# Patient Record
Sex: FEMALE | Race: WHITE | Employment: FULL TIME | URBAN - METROPOLITAN AREA
[De-identification: names, ages, dates, MRNs, and addresses within clinical notes are randomized per-mention and may not be internally consistent; named-entity substitution may affect disease eponyms.]

---

## 2019-03-12 ENCOUNTER — TELEPHONE (OUTPATIENT)
Dept: OTHER | Facility: CLINIC | Age: 34
End: 2019-03-12

## 2019-08-01 ENCOUNTER — TRANSFERRED RECORDS (OUTPATIENT)
Dept: MULTI SPECIALTY CLINIC | Facility: CLINIC | Age: 34
End: 2019-08-01

## 2019-08-01 LAB — PAP SMEAR - HIM PATIENT REPORTED: NEGATIVE

## 2020-02-20 ENCOUNTER — OFFICE VISIT (OUTPATIENT)
Dept: FAMILY MEDICINE | Facility: CLINIC | Age: 35
End: 2020-02-20
Payer: COMMERCIAL

## 2020-02-20 VITALS
OXYGEN SATURATION: 98 % | HEART RATE: 82 BPM | TEMPERATURE: 98.8 F | SYSTOLIC BLOOD PRESSURE: 102 MMHG | DIASTOLIC BLOOD PRESSURE: 62 MMHG | WEIGHT: 151 LBS

## 2020-02-20 DIAGNOSIS — H53.8 BLURRED VISION: Primary | ICD-10-CM

## 2020-02-20 PROCEDURE — 99203 OFFICE O/P NEW LOW 30 MIN: CPT | Performed by: FAMILY MEDICINE

## 2020-02-20 RX ORDER — CITALOPRAM HYDROBROMIDE 40 MG/1
TABLET ORAL
COMMUNITY
Start: 2019-08-02 | End: 2020-07-13

## 2020-02-20 RX ORDER — VITAMIN E 268 MG
CAPSULE ORAL
COMMUNITY
End: 2020-05-20

## 2020-02-20 RX ORDER — COPPER 313.4 MG/1
1 INTRAUTERINE DEVICE INTRAUTERINE ONCE
COMMUNITY

## 2020-02-20 NOTE — Clinical Note
Please abstract the following data from this visit with this patient into the appropriate field in Epic:Tests that can be patient reported without a hard copy:Pap smear done on this date: 8/2019 (approximately), by this group: [facility] , results were  Normal . Other Tests found in the patient's chart through Chart Review/Care Everywhere:{Abstract Quality List (Optional):875228}Note to Abstraction: If this section is blank, no results were found via Chart Review/Care Everywhere.

## 2020-02-20 NOTE — PROGRESS NOTES
Subjective     Leslie Villanueva is a 34 year old female who presents to clinic today for the following health issues:    HPI   Concern - vision concerns   Onset: yesterday for a few hours     Description:   Pt was having blurred, double vision     Intensity: mild    Progression of Symptoms:  improving    Accompanying Signs & Symptoms:      Previous history of similar problem:       Precipitating factors:   Worsened by:     Alleviating factors:  Improved by:     Therapies Tried and outcome:       There is no problem list on file for this patient.    No past surgical history on file.    Social History     Tobacco Use     Smoking status: Never Smoker     Smokeless tobacco: Never Used   Substance Use Topics     Alcohol use: Not on file     No family history on file.      Current Outpatient Medications   Medication Sig Dispense Refill     citalopram 40 MG PO tablet TK 1 T PO HS UTD       paragard intrauterine copper IU device 1 each by Intrauterine route once       cholecalciferol 25 MCG (1000 UT) PO TABS Take 1,000 Units by mouth       vitamin E (VITAMIN E) 400 units (360 mg) PO capsule        No Known Allergies  No lab results found.   BP Readings from Last 3 Encounters:   02/20/20 102/62    Wt Readings from Last 3 Encounters:   02/20/20 68.5 kg (151 lb)                    Reviewed and updated as needed this visit by Provider         Review of Systems   ROS COMP: Constitutional, HEENT, cardiovascular, pulmonary, gi and gu systems are negative, except as otherwise noted.      Objective    /62   Pulse 82   Temp 98.8  F (37.1  C) (Oral)   Wt 68.5 kg (151 lb)   LMP 02/14/2020   SpO2 98%   There is no height or weight on file to calculate BMI.  Physical Exam   GENERAL: healthy, alert and no distress  NECK: no adenopathy, no asymmetry, masses, or scars and thyroid normal to palpation  RESP: lungs clear to auscultation - no rales, rhonchi or wheezes  CV: regular rate and rhythm, normal S1 S2, no S3 or S4, no murmur,  click or rub, no peripheral edema and peripheral pulses strong  ABDOMEN: soft, nontender, no hepatosplenomegaly, no masses and bowel sounds normal  MS: no gross musculoskeletal defects noted, no edema  NEURO: Normal strength and tone, mentation intact and speech normal            Assessment & Plan       ICD-10-CM    1. Blurred vision H53.8       has been improving, seen by optometry and not seeing any abnormal finding  Has normal neurologic exam today as well  Will have her to increase hydration and not using decongestants nor antihistamine for current sinus sx, ok using Flonase for nasal sx  If recurring, will have her to see ophthalmologist       No follow-ups on file.    Jeffrey Lambert MD  [facility] [place]

## 2020-03-11 ENCOUNTER — HEALTH MAINTENANCE LETTER (OUTPATIENT)
Age: 35
End: 2020-03-11

## 2020-05-18 ENCOUNTER — TELEPHONE (OUTPATIENT)
Dept: FAMILY MEDICINE | Facility: CLINIC | Age: 35
End: 2020-05-18

## 2020-05-18 NOTE — TELEPHONE ENCOUNTER
Reason for call:  Patient reporting a symptom    Symptom or request: Right shoulder pain getting worse. Unable to sleep at night.      Duration (how long have symptoms been present): Last Sept.    Have you been treated for this before? Yes    Additional comments: Please call to triage patient    Phone Number patient can be reached at:  Cell number on file:    Telephone Information:   Mobile [phone]       Best Time:  now    Can we leave a detailed message on this number:  YES    Call taken on 5/18/2020 at 12:06 PM by Meme Mejia

## 2020-05-18 NOTE — TELEPHONE ENCOUNTER
Pt calling with right shoulder pain that she has been having since last fall.  Has seen a chiropractor who told her she needs an MRI at this time.  Pt states the pain is getting worse and she is not able to sleep at night.  Taking ibuprofen and tylenol with little relief.  Wondering how to get this done?    Advised needs to be seen and scheduled with Dr. Lambert on Wednesday 5/20 at 1:40.    Hilda DONALD RN  EP Triage

## 2020-05-20 ENCOUNTER — OFFICE VISIT (OUTPATIENT)
Dept: FAMILY MEDICINE | Facility: CLINIC | Age: 35
End: 2020-05-20
Payer: COMMERCIAL

## 2020-05-20 VITALS
OXYGEN SATURATION: 96 % | TEMPERATURE: 99.5 F | DIASTOLIC BLOOD PRESSURE: 68 MMHG | SYSTOLIC BLOOD PRESSURE: 100 MMHG | HEART RATE: 76 BPM | WEIGHT: 148 LBS

## 2020-05-20 DIAGNOSIS — M75.81 TENDINITIS OF RIGHT ROTATOR CUFF: Primary | ICD-10-CM

## 2020-05-20 PROCEDURE — 99213 OFFICE O/P EST LOW 20 MIN: CPT | Performed by: FAMILY MEDICINE

## 2020-05-20 RX ORDER — CYCLOBENZAPRINE HCL 10 MG
10 TABLET ORAL 2 TIMES DAILY PRN
Qty: 20 TABLET | Refills: 0 | Status: SHIPPED | OUTPATIENT
Start: 2020-05-20 | End: 2020-08-24

## 2020-05-20 RX ORDER — FLUTICASONE PROPIONATE 50 MCG
1 SPRAY, SUSPENSION (ML) NASAL DAILY
COMMUNITY
End: 2020-07-24

## 2020-05-20 NOTE — PROGRESS NOTES
Subjective     Leslie Villanueva is a 34 year old female who presents to clinic today for the following health issues:    HPI   Joint Pain    Onset: worse since September     Description:   Location: right shoulder  Character: Sharp    Intensity: moderate, severe    Progression of Symptoms: worse    Accompanying Signs & Symptoms:  Other symptoms: numbness in the morning     History:   Previous similar pain: YES      Precipitating factors:   Trauma or overuse: no     Alleviating factors:  Improved by: ice and Advil     Therapies Tried and outcome:  Ice, Advil         There is no problem list on file for this patient.    No past surgical history on file.    Social History     Tobacco Use     Smoking status: Never Smoker     Smokeless tobacco: Never Used   Substance Use Topics     Alcohol use: Not on file     No family history on file.      Current Outpatient Medications   Medication Sig Dispense Refill     cholecalciferol 25 MCG (1000 UT) PO TABS Take 1,000 Units by mouth       citalopram 40 MG PO tablet TK 1 T PO HS UTD       cyclobenzaprine (FLEXERIL) 10 MG tablet Take 1 tablet (10 mg) by mouth 2 times daily as needed for muscle spasms 20 tablet 0     fluticasone (FLONASE) 50 MCG/ACT nasal spray Spray 1 spray into both nostrils daily       paragard intrauterine copper IU device 1 each by Intrauterine route once       No Known Allergies  No lab results found.   BP Readings from Last 3 Encounters:   05/20/20 100/68   02/20/20 102/62    Wt Readings from Last 3 Encounters:   05/20/20 67.1 kg (148 lb)   02/20/20 68.5 kg (151 lb)                    Reviewed and updated as needed this visit by Provider         Review of Systems   Constitutional, HEENT, cardiovascular, pulmonary, gi and gu systems are negative, except as otherwise noted.      Objective    /68   Pulse 76   Temp 99.5  F (37.5  C) (Tympanic)   Wt 67.1 kg (148 lb)   LMP 04/22/2020   SpO2 96%   There is no height or weight on file to calculate  BMI.  Physical Exam   GENERAL: healthy, alert and no distress  NECK: no adenopathy, no asymmetry, masses, or scars and thyroid normal to palpation  RESP: lungs clear to auscultation - no rales, rhonchi or wheezes  CV: regular rate and rhythm, normal S1 S2, no S3 or S4, no murmur, click or rub, no peripheral edema and peripheral pulses strong  ABDOMEN: soft, nontender, no hepatosplenomegaly, no masses and bowel sounds normal  MS: FROM on right shoulder/postive empty can sign ad cross over test             Assessment & Plan       ICD-10-CM    1. Tendinitis of right rotator cuff  M75.81 cyclobenzaprine (FLEXERIL) 10 MG tablet     MR Shoulder Right w/o Contrast      since she has been having pain without relief after PT for last 5-6 months, will have her to check MR for further evaluation       No follow-ups on file.    Jeffrey Lambert MD  [facility] [place]

## 2020-05-28 ENCOUNTER — HOSPITAL ENCOUNTER (OUTPATIENT)
Dept: MRI IMAGING | Facility: CLINIC | Age: 35
Discharge: HOME | End: 2020-05-28
Attending: FAMILY MEDICINE | Admitting: FAMILY MEDICINE
Payer: COMMERCIAL

## 2020-05-28 DIAGNOSIS — S43.431A LABRAL TEAR OF SHOULDER, RIGHT, INITIAL ENCOUNTER: Primary | ICD-10-CM

## 2020-05-28 DIAGNOSIS — M75.81 TENDINITIS OF RIGHT ROTATOR CUFF: ICD-10-CM

## 2020-05-28 PROCEDURE — 73221 MRI JOINT UPR EXTREM W/O DYE: CPT | Mod: RT

## 2020-06-02 ENCOUNTER — OFFICE VISIT (OUTPATIENT)
Dept: ORTHOPEDICS | Facility: CLINIC | Age: 35
End: 2020-06-02
Attending: FAMILY MEDICINE
Payer: COMMERCIAL

## 2020-06-02 VITALS — SYSTOLIC BLOOD PRESSURE: 96 MMHG | WEIGHT: 148 LBS | DIASTOLIC BLOOD PRESSURE: 64 MMHG

## 2020-06-02 DIAGNOSIS — G89.29 CHRONIC RIGHT SHOULDER PAIN: ICD-10-CM

## 2020-06-02 DIAGNOSIS — M25.511 CHRONIC RIGHT SHOULDER PAIN: ICD-10-CM

## 2020-06-02 DIAGNOSIS — M24.111 LABRAL TEAR OF SHOULDER, DEGENERATIVE, RIGHT: ICD-10-CM

## 2020-06-02 DIAGNOSIS — M75.51 SUBACROMIAL BURSITIS OF RIGHT SHOULDER JOINT: Primary | ICD-10-CM

## 2020-06-02 DIAGNOSIS — S43.431A LABRAL TEAR OF SHOULDER, RIGHT, INITIAL ENCOUNTER: ICD-10-CM

## 2020-06-02 DIAGNOSIS — G25.89 SCAPULAR DYSKINESIS: ICD-10-CM

## 2020-06-02 PROCEDURE — 99203 OFFICE O/P NEW LOW 30 MIN: CPT | Performed by: ORTHOPAEDIC SURGERY

## 2020-06-02 RX ORDER — MELOXICAM 15 MG/1
15 TABLET ORAL DAILY
Qty: 30 TABLET | Refills: 1 | Status: SHIPPED | OUTPATIENT
Start: 2020-06-02 | End: 2020-07-24

## 2020-06-02 NOTE — PROGRESS NOTES
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  Symptoms started in approximately 4-5 years ago, pain has been intermittent. She reports increased pain in the shoulder in August 2019, and most severe pain since February 2020. There was a precipitating event which she recalls as reaching into the back seat for a sippy cup approximately 4-5 years ago.  She also attributes some of her shoulder pain to frequent traveling for 1 1/2 years prior to August 2019, she notes she carries her luggage with her right extremity.  The pain is located to the anterior and lateral shoulder. Worst pain is rated a 9 of 10, and current pain is rated at 2 of 10. Symptoms are worsened by looking at her watch, at nighttime will wake due to pain if laying on the left, right arm will feel numb if she lays on the right. She also states she has discontinued Mavis for 6 months because of the increased pain.. Symptoms are improved with activity avoidance, initial chiropractic care. Patient has tried right subacromial cortisone injection 8/2019 with moderate relief lasting 4-5 months relief, chiropractic care, cyclobenzaprine, ibuprofen TID. Patient has not had any recent physical therapy.  Associated symptoms include no weakness, sensation of instability with reaching overhead. Notably, the patient has had no previous history of injury or trauma to the upper extremities. No other concerns or complaints at this time.    PAST MEDICAL HISTORY:  1. Anxiety    CURRENT MEDICATIONS:  1. Celexa  2. Vitamin D  3. Vitamin B Complex    ALLERGIES:   NKDA    PAST SURGICAL HISTORY:  1. Ovarian cyst removal 9 years ago  2. Tonsillectomy 10 years ago    FAMILY  HISTORY: No known family history of bleeding, clotting, or anesthesia related complications.    SOCIAL HISTORY: Patient is  and lives at home with her  and two kids (ages 6 and 8). 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 She enjoys Mavis, Piliates, and Yoga - but she has been unable to do these since her shoulder has been bothering her.    TOXIC HABITS:  I spoke with Leslie today regarding tobacco use and they informed me that they do not use any tobacco products.    REVIEW OF SYSTEMS:  General: Denies fevers, chills, or night sweats.  Skin: Denies rashes or lesions.  Head: Denies headache or dizziness.  Eyes: Denies vision changes or eye pain.  Ears: Denies ear pain or decreased hearing.  Nose: Denies nose bleeding or sinus pain.  Mouth & Throat: Denies bleeding gums or sore throat.  Neck: Denies neck pain or stiffness.  Respiratory: Denies cough, wheezing, sob, or hemoptysis.  Cardiovascular: Denies chest pain, chest pressure, or palpitations.   Gastrointestinal: Denies abdominal pain, nausea, vomiting, diarrhea, or constipation.  Genitourinary: Denies difficulty or pain with urination.   Musculoskeletal: As noted above in the HPI, otherwise normal.  Neuro: Denies paralysis, weakness, paresthesias, or speech difficulty.  Lymphatics: Denies lymphadenopathy.  Psych: Denies anxiety, sadness, or irritability.    PHYSICAL EXAM:  Patient weighs 148 lbs 0 oz. BP 96/64 (BP Location: Right arm, Patient Position: Chair, Cuff Size: Adult Regular)   Wt 67.1 kg (148 lb)   Constitutional: Well-developed, well-nourished, healthy appearing female.  Skin: Warm, dry, and without rashes.   HEENT: Normal.  Cardiac: Well perfused extremities, strong 2+ peripheral pulses. No edema.   Pulmonary: Non-labored respirations on room air without audible wheezes.   Abdomen: Soft, nontender.  Musculoskeletal: Patient ambulates with a slow, symmetric, and steady gait. Active range of motion of the right shoulder  is 170/95/70/T10 compared to 170/95/70/T6 on the left. Right shoulder has positive Neer, Fernandez, Active compression test, Modified dynamic labral shear test, and moderate scapular dyskinesis but no winging; all negative on the left shoulder. Full symmetric and painless cuff strength. Bilateral shoulders with 2+ anterior load and shift, 1+ posterior load and shift. Equivocal Jerk/Yudith test on the right, normal on the left. No AC, SC, biceps signs, cross-body adduction, Nisreen, scars, atrophy, deformity, belly-press, lift-off, external rotation lag sign, internal rotation lag sign, hornblower's bilaterally. No generalized ligamentous laxity. Neurovascular exam and cervical spine exam are normal.    IMAGING:   MRI of the right shoulder is notable for a area of subacromial bursitis, and a poorly defined posterior superior labral tear.  No other notable findings on the MRI.    IMPRESSION: 34 year old-year-old left hand dominant female, with a right shoulder posterosuperior labral tear, subacromial bursitis, and scapular dyskinesis.     PLAN:   I had a long conversation with Leslie today.  At the present time her history, clinical exam, and imaging findings are most consistent with painful subacromial bursitis and a posterior superior labral tear.  She also has a component of scapular dyskinesis on exam.  I explained that all of these etiologies can be  treated with a course of anti-inflammatory medications and physical therapy.  As such I recommend a course of meloxicam 15 mg daily to be taken with food for 30 days with 1 refill.  She should stop if she develops stomach pain or discomfort.  She should also start a course of physical therapy under the direction of an experienced shoulder therapist.  I have referred her to Martin Hough at [facility].  This should be a comprehensive evaluation and management of her scapular dyskinesis cuff strength and a dedicated home exercise program.    I recommend that she returns to  clinic to see me in 3 to 4 months for repeat clinical evaluation to monitor her progress with therapy and anti-inflammatories.  He can always consider an injection at that time.  Of note the patient did declined to obtain x-rays today stating that she had these previously.  Unfortunately were unable to access them today.  We will try to get those before her next visit.    At the conclusion of the office visit, Leslie verbally acknowledged that I answered all of her questions satisfactorily.

## 2020-06-02 NOTE — LETTER
6/2/2020         RE: Leslie Villanueva  [address] [place] [address]        Dear Colleague,    Thank you for referring your patient, Leslie Villanueva, to the [facility]. Please see a copy of my visit note below.    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  Symptoms started in approximately 4-5 years ago, pain has been intermittent. She reports increased pain in the shoulder in August 2019, and most severe pain since February 2020. There was a precipitating event which she recalls as reaching into the back seat for a sippy cup approximately 4-5 years ago.  She also attributes some of her shoulder pain to frequent traveling for 1 1/2 years prior to August 2019, she notes she carries her luggage with her right extremity.  The pain is located to the anterior and lateral shoulder. Worst pain is rated a 9 of 10, and current pain is rated at 2 of 10. Symptoms are worsened by looking at her watch, at nighttime will wake due to pain if laying on the left, right arm will feel numb if she lays on the right. She also states she has discontinued Mavis for 6 months because of the increased pain.. Symptoms are improved with activity avoidance, initial chiropractic care. Patient has tried right subacromial cortisone injection 8/2019 with moderate relief lasting 4-5 months relief, chiropractic care, cyclobenzaprine, ibuprofen TID. Patient has not had any recent physical therapy.  Associated symptoms include no weakness, sensation of instability with reaching overhead. Notably, the patient has had no previous history of injury or trauma to the upper extremities. No other concerns or complaints at this  time.    PAST MEDICAL HISTORY:  1. Anxiety    CURRENT MEDICATIONS:  1. Celexa  2. Vitamin D  3. Vitamin B Complex    ALLERGIES:   NKDA    PAST SURGICAL HISTORY:  1. Ovarian cyst removal 9 years ago  2. Tonsillectomy 10 years ago    FAMILY HISTORY: No known family history of bleeding, clotting, or anesthesia related complications.    SOCIAL HISTORY: Patient is  and lives at home with her  and two kids (ages 6 and 8). 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    TOXIC HABITS:  I spoke with Leslie today regarding tobacco use and they informed me that they do not use any tobacco products.    REVIEW OF SYSTEMS:  General: Denies fevers, chills, or night sweats.  Skin: Denies rashes or lesions.  Head: Denies headache or dizziness.  Eyes: Denies vision changes or eye pain.  Ears: Denies ear pain or decreased hearing.  Nose: Denies nose bleeding or sinus pain.  Mouth & Throat: Denies bleeding gums or sore throat.  Neck: Denies neck pain or stiffness.  Respiratory: Denies cough, wheezing, sob, or hemoptysis.  Cardiovascular: Denies chest pain, chest pressure, or palpitations.   Gastrointestinal: Denies abdominal pain, nausea, vomiting, diarrhea, or constipation.  Genitourinary: Denies difficulty or pain with urination.   Musculoskeletal: As noted above in the HPI, otherwise normal.  Neuro: Denies paralysis, weakness, paresthesias, or speech difficulty.  Lymphatics: Denies lymphadenopathy.  Psych: Denies anxiety, sadness, or irritability.    PHYSICAL EXAM:  Patient weighs 148 lbs 0 oz. BP 96/64 (BP Location: Right arm, Patient Position: Chair, Cuff Size: Adult Regular)   Wt 67.1 kg (148 lb)   Constitutional: Well-developed, well-nourished, healthy appearing female.  Skin: Warm, dry, and without rashes.   HEENT: Normal.  Cardiac: Well perfused extremities, strong 2+ peripheral  pulses. No edema.   Pulmonary: Non-labored respirations on room air without audible wheezes.   Abdomen: Soft, nontender.  Musculoskeletal: Patient ambulates with a slow, symmetric, and steady gait. Active range of motion of the right shoulder is 170/95/70/T10 compared to 170/95/70/T6 on the left. Right shoulder has positive Neer, Fernandez, Active compression test, Modified dynamic labral shear test, and moderate scapular dyskinesis but no winging; all negative on the left shoulder. Full symmetric and painless cuff strength. Bilateral shoulders with 2+ anterior load and shift, 1+ posterior load and shift. Equivocal Jerk/Yudith test on the right, normal on the left. No AC, SC, biceps signs, cross-body adduction, Nisreen, scars, atrophy, deformity, belly-press, lift-off, external rotation lag sign, internal rotation lag sign, hornblower's bilaterally. No generalized ligamentous laxity. Neurovascular exam and cervical spine exam are normal.    IMAGING:   MRI of the right shoulder is notable for a area of subacromial bursitis, and a poorly defined posterior superior labral tear.  No other notable findings on the MRI.    IMPRESSION: 34 year old-year-old left hand dominant female, with a right shoulder posterosuperior labral tear, subacromial bursitis, and scapular dyskinesis.     PLAN:   I had a long conversation with Leslie today.  At the present time her history, clinical exam, and imaging findings are most consistent with painful subacromial bursitis and a posterior superior labral tear.  She also has a component of scapular dyskinesis on exam.  I explained that all of these etiologies can be  treated with a course of anti-inflammatory medications and physical therapy.  As such I recommend a course of meloxicam 15 mg daily to be taken with food for 30 days with 1 refill.  She should stop if she develops stomach pain or discomfort.  She should also start a course of physical therapy under the direction of an experienced  shoulder therapist.  I have referred her to Martin Hough at [facility].  This should be a comprehensive evaluation and management of her scapular dyskinesis cuff strength and a dedicated home exercise program.    I recommend that she returns to clinic to see me in 3 to 4 months for repeat clinical evaluation to monitor her progress with therapy and anti-inflammatories.  He can always consider an injection at that time.  Of note the patient did declined to obtain x-rays today stating that she had these previously.  Unfortunately were unable to access them today.  We will try to get those before her next visit.    At the conclusion of the office visit, Leslie verbally acknowledged that I answered all of her questions satisfactorily.      Again, thank you for allowing me to participate in the care of your patient.        Sincerely,        Scottie Yap MD

## 2020-06-02 NOTE — PATIENT INSTRUCTIONS
Meloxicam has been sent electroincally to your pharmacy on file. While you are taking this medication please do not take any additional Ibuprofen, Advil, Aleve, Motrin, Naproxen, or any NSAIDs.  We recommend taking this medication with food.     Physical therapy orders have also been placed with [facility], they will call you to schedule your first appointment.     Follow up in 3 months, or sooner if pain does not improve.     Call my office with any question or concerns, [phone].

## 2020-06-10 ENCOUNTER — THERAPY VISIT (OUTPATIENT)
Dept: PHYSICAL THERAPY | Facility: CLINIC | Age: 35
End: 2020-06-10
Payer: COMMERCIAL

## 2020-06-10 DIAGNOSIS — M75.51 SUBACROMIAL BURSITIS OF RIGHT SHOULDER JOINT: ICD-10-CM

## 2020-06-10 DIAGNOSIS — G25.89 SCAPULAR DYSKINESIS: ICD-10-CM

## 2020-06-10 DIAGNOSIS — M24.111 LABRAL TEAR OF SHOULDER, DEGENERATIVE, RIGHT: ICD-10-CM

## 2020-06-10 DIAGNOSIS — S43.431A TEAR OF RIGHT GLENOID LABRUM: ICD-10-CM

## 2020-06-10 PROCEDURE — 97161 PT EVAL LOW COMPLEX 20 MIN: CPT | Mod: GP

## 2020-06-10 PROCEDURE — 97110 THERAPEUTIC EXERCISES: CPT | Mod: GP

## 2020-06-10 PROCEDURE — 97112 NEUROMUSCULAR REEDUCATION: CPT | Mod: GP

## 2020-06-10 NOTE — PROGRESS NOTES
[facility] Initial Evaluation  Subjective:    Therapist Generated HPI Evaluation  Problem details: Onset: a few years ago; not sure why; has been getting worse over the past years; since last Sept had to do a lot of travel and really aggravated.  Saw [facility] and had shot which helped until Feb when it got worse again.  .         Type of problem:  Right shoulder.    This is a new condition.  Condition occurred with:  Unknown cause.    Patient reports pain:  Anterior and posterior.        Associated symptoms:  Loss of motion/stiffness. Symptoms are exacerbated by certain positions, lying on extremity and lifting  and relieved by nothing.                              Objective:  Standing Alignment:      Shoulder/UE:  Scapular winging R                                       Shoulder Evaluation:  ROM:  AROM:    Flexion:  Right:  180    Abduction:  Right:  180    Internal Rotation:  Right:  47  External Rotation:  Right:  120                      Strength:  : R RTC grossly 4+/5.                        Special Tests:  Special tests assessed shoulder: + [place]asas's.    Right shoulder positive for the following special tests:Impingement  Palpation:  Palpation assessed shoulder: TTP coracoid process.      Mobility Tests:  Mobility wnl shoulder: scapular dyskinesis R                                                  General     ROS    Assessment/Plan:    Patient is a 35 year old female with right side shoulder complaints.    Patient has the following significant findings with corresponding treatment plan.                Diagnosis 1:  R sh Scapular dyskinesis, labral tear  Pain -  self management, education and home program  Decreased strength - therapeutic exercise and therapeutic activities  Impaired muscle performance - neuro re-education  Decreased function - therapeutic activities    Therapy Evaluation Codes:     Previous and current functional limitations:  (See Goal Flow Sheet for this information)     Short term and Long term goals: (See Goal Flow Sheet for this information)     Communication ability:  Patient appears to be able to clearly communicate and understand verbal and written communication and follow directions correctly.  Treatment Explanation - The following has been discussed with the patient:   RX ordered/plan of care  Anticipated outcomes  Possible risks and side effects  This patient would benefit from PT intervention to resume normal activities.   Rehab potential is good.    Frequency:  1 X week, once daily  Duration:  for 8 weeks  Discharge Plan:  Achieve all LTG.  Independent in home treatment program.  Reach maximal therapeutic benefit.    Please refer to the daily flowsheet for treatment today, total treatment time and time spent performing 1:1 timed codes.

## 2020-06-19 ENCOUNTER — THERAPY VISIT (OUTPATIENT)
Dept: PHYSICAL THERAPY | Facility: CLINIC | Age: 35
End: 2020-06-19
Payer: COMMERCIAL

## 2020-06-19 DIAGNOSIS — S43.431A TEAR OF RIGHT GLENOID LABRUM: ICD-10-CM

## 2020-06-19 DIAGNOSIS — G25.89 SCAPULAR DYSKINESIS: ICD-10-CM

## 2020-06-19 PROCEDURE — 97112 NEUROMUSCULAR REEDUCATION: CPT | Mod: GP

## 2020-06-19 PROCEDURE — 97110 THERAPEUTIC EXERCISES: CPT | Mod: GP

## 2020-07-08 ENCOUNTER — THERAPY VISIT (OUTPATIENT)
Dept: PHYSICAL THERAPY | Facility: CLINIC | Age: 35
End: 2020-07-08
Payer: COMMERCIAL

## 2020-07-08 DIAGNOSIS — G25.89 SCAPULAR DYSKINESIS: ICD-10-CM

## 2020-07-08 DIAGNOSIS — S43.431A TEAR OF RIGHT GLENOID LABRUM: ICD-10-CM

## 2020-07-08 PROCEDURE — 97110 THERAPEUTIC EXERCISES: CPT | Mod: GP

## 2020-07-08 PROCEDURE — 97112 NEUROMUSCULAR REEDUCATION: CPT | Mod: GP

## 2020-07-24 ENCOUNTER — OFFICE VISIT (OUTPATIENT)
Dept: FAMILY MEDICINE | Facility: CLINIC | Age: 35
End: 2020-07-24
Payer: COMMERCIAL

## 2020-07-24 ENCOUNTER — THERAPY VISIT (OUTPATIENT)
Dept: PHYSICAL THERAPY | Facility: CLINIC | Age: 35
End: 2020-07-24
Payer: COMMERCIAL

## 2020-07-24 VITALS
SYSTOLIC BLOOD PRESSURE: 90 MMHG | OXYGEN SATURATION: 95 % | DIASTOLIC BLOOD PRESSURE: 70 MMHG | WEIGHT: 148.6 LBS | HEIGHT: 67 IN | TEMPERATURE: 98.5 F | BODY MASS INDEX: 23.32 KG/M2 | RESPIRATION RATE: 18 BRPM | HEART RATE: 82 BPM

## 2020-07-24 DIAGNOSIS — S43.431A TEAR OF RIGHT GLENOID LABRUM: ICD-10-CM

## 2020-07-24 DIAGNOSIS — L70.0 ACNE VULGARIS: Primary | ICD-10-CM

## 2020-07-24 DIAGNOSIS — B09 VIRAL EXANTHEM: ICD-10-CM

## 2020-07-24 DIAGNOSIS — G25.89 SCAPULAR DYSKINESIS: ICD-10-CM

## 2020-07-24 PROCEDURE — 99214 OFFICE O/P EST MOD 30 MIN: CPT | Performed by: FAMILY MEDICINE

## 2020-07-24 PROCEDURE — 97112 NEUROMUSCULAR REEDUCATION: CPT | Mod: GP

## 2020-07-24 PROCEDURE — 97110 THERAPEUTIC EXERCISES: CPT | Mod: GP

## 2020-07-24 RX ORDER — PREDNISONE 20 MG/1
TABLET ORAL
Qty: 20 TABLET | Refills: 0 | Status: SHIPPED | OUTPATIENT
Start: 2020-07-24 | End: 2020-12-18

## 2020-07-24 RX ORDER — HYDROXYZINE HYDROCHLORIDE 10 MG/1
10 TABLET, FILM COATED ORAL 3 TIMES DAILY PRN
Qty: 20 TABLET | Refills: 0 | Status: SHIPPED | OUTPATIENT
Start: 2020-07-24 | End: 2020-12-18

## 2020-07-24 RX ORDER — CLINDAMYCIN PHOSPHATE 10 MG/G
GEL TOPICAL 2 TIMES DAILY
Qty: 30 G | Refills: 3 | Status: SHIPPED | OUTPATIENT
Start: 2020-07-24 | End: 2021-01-27

## 2020-07-24 ASSESSMENT — MIFFLIN-ST. JEOR: SCORE: 1391.68

## 2020-07-24 NOTE — PROGRESS NOTES
Subjective     Leslie Villanueva is a 35 year old female who presents to clinic today for the following health issues:    HPI       She has some rash on both of her legs for couple of days and the rash are really itching.      Patient Active Problem List   Diagnosis     Scapular dyskinesis     Tear of right glenoid labrum     History reviewed. No pertinent surgical history.    Social History     Tobacco Use     Smoking status: Never Smoker     Smokeless tobacco: Never Used   Substance Use Topics     Alcohol use: Yes     Family History   Problem Relation Age of Onset     No Known Problems Mother      No Known Problems Father          Current Outpatient Medications   Medication Sig Dispense Refill     cholecalciferol 25 MCG (1000 UT) PO TABS Take 1,000 Units by mouth       citalopram (CELEXA) 40 MG tablet Take 1 tablet (40 mg) by mouth daily 90 tablet 1     clindamycin (CLINDAMAX) 1 % external gel Apply topically 2 times daily 30 g 3     cyclobenzaprine (FLEXERIL) 10 MG tablet Take 1 tablet (10 mg) by mouth 2 times daily as needed for muscle spasms 20 tablet 0     hydrOXYzine (ATARAX) 10 MG tablet Take 1 tablet (10 mg) by mouth 3 times daily as needed for itching 20 tablet 0     paragard intrauterine copper IU device 1 each by Intrauterine route once       predniSONE (DELTASONE) 20 MG tablet Take 3 tabs by mouth daily x 3 days, then 2 tabs daily x 3 days, then 1 tab daily x 3 days, then 1/2 tab daily x 3 days. 20 tablet 0     vitamin B-Complex Take 1 tablet by mouth daily       No Known Allergies  No lab results found.   BP Readings from Last 3 Encounters:   07/24/20 90/70   06/02/20 96/64   05/20/20 100/68    Wt Readings from Last 3 Encounters:   07/24/20 67.4 kg (148 lb 9.6 oz)   06/02/20 67.1 kg (148 lb)   05/20/20 67.1 kg (148 lb)                    Reviewed and updated as needed this visit by Provider         Review of Systems   Constitutional, HEENT, cardiovascular, pulmonary, gi and gu systems are negative, except  "as otherwise noted.      Objective    BP 90/70 (BP Location: Right arm, Patient Position: Sitting, Cuff Size: Adult Regular)   Pulse 82   Temp 98.5  F (36.9  C) (Oral)   Resp 18   Ht 1.702 m (5' 7\")   Wt 67.4 kg (148 lb 9.6 oz)   LMP 07/11/2020 (Exact Date)   SpO2 95%   BMI 23.27 kg/m    Body mass index is 23.27 kg/m .  Physical Exam   GENERAL: healthy, alert and no distress  NECK: no adenopathy, no asymmetry, masses, or scars and thyroid normal to palpation  RESP: lungs clear to auscultation - no rales, rhonchi or wheezes  CV: regular rate and rhythm, normal S1 S2, no S3 or S4, no murmur, click or rub, no peripheral edema and peripheral pulses strong  ABDOMEN: soft, nontender, no hepatosplenomegaly, no masses and bowel sounds normal  MS: no gross musculoskeletal defects noted, no edema  SKIN: multiple macular rash with central umbilication on lower extremities and torso, itching and has no other sign of infection   Papular follicular inflammation on right cheek         Assessment & Plan     1. Acne vulgaris  On face,  Will have her to try clindamycin gel as needed   - clindamycin (CLINDAMAX) 1 % external gel; Apply topically 2 times daily  Dispense: 30 g; Refill: 3    2. Viral exanthem  Was started 1-2 weeks ago, got better after steroid treatment, got worse after steroid treatment, will have her to try longer tapering course and antihistamin   - hydrOXYzine (ATARAX) 10 MG tablet; Take 1 tablet (10 mg) by mouth 3 times daily as needed for itching  Dispense: 20 tablet; Refill: 0  - predniSONE (DELTASONE) 20 MG tablet; Take 3 tabs by mouth daily x 3 days, then 2 tabs daily x 3 days, then 1 tab daily x 3 days, then 1/2 tab daily x 3 days.  Dispense: 20 tablet; Refill: 0       FUTURE APPOINTMENTS:       - Follow-up visit in 1-2 weeks if not improving     No follow-ups on file.    Jeffrey Lambert MD  [facility]    "

## 2020-08-14 ENCOUNTER — THERAPY VISIT (OUTPATIENT)
Dept: PHYSICAL THERAPY | Facility: CLINIC | Age: 35
End: 2020-08-14
Payer: COMMERCIAL

## 2020-08-14 DIAGNOSIS — G25.89 SCAPULAR DYSKINESIS: ICD-10-CM

## 2020-08-14 DIAGNOSIS — S43.431A TEAR OF RIGHT GLENOID LABRUM: ICD-10-CM

## 2020-08-14 PROCEDURE — 97112 NEUROMUSCULAR REEDUCATION: CPT | Mod: GP

## 2020-08-14 PROCEDURE — 97110 THERAPEUTIC EXERCISES: CPT | Mod: GP

## 2020-08-24 ENCOUNTER — MYC REFILL (OUTPATIENT)
Dept: FAMILY MEDICINE | Facility: CLINIC | Age: 35
End: 2020-08-24

## 2020-08-24 DIAGNOSIS — M75.81 TENDINITIS OF RIGHT ROTATOR CUFF: ICD-10-CM

## 2020-08-24 RX ORDER — CYCLOBENZAPRINE HCL 10 MG
10 TABLET ORAL 2 TIMES DAILY PRN
Qty: 20 TABLET | Refills: 0 | Status: SHIPPED | OUTPATIENT
Start: 2020-08-24 | End: 2020-12-18

## 2020-08-24 NOTE — TELEPHONE ENCOUNTER
Routing refill request to provider for review/approval because:  Drug not on the [facility] refill protocol     Hilda DONALD RN  EP Triage

## 2020-09-01 ENCOUNTER — OFFICE VISIT (OUTPATIENT)
Dept: ORTHOPEDICS | Facility: CLINIC | Age: 35
End: 2020-09-01
Payer: COMMERCIAL

## 2020-09-01 VITALS
BODY MASS INDEX: 23.83 KG/M2 | SYSTOLIC BLOOD PRESSURE: 117 MMHG | WEIGHT: 151.8 LBS | HEIGHT: 67 IN | DIASTOLIC BLOOD PRESSURE: 76 MMHG

## 2020-09-01 DIAGNOSIS — M24.111 LABRAL TEAR OF SHOULDER, DEGENERATIVE, RIGHT: Primary | ICD-10-CM

## 2020-09-01 PROCEDURE — 99213 OFFICE O/P EST LOW 20 MIN: CPT | Mod: 25 | Performed by: ORTHOPAEDIC SURGERY

## 2020-09-01 PROCEDURE — 20611 DRAIN/INJ JOINT/BURSA W/US: CPT | Mod: RT | Performed by: ORTHOPAEDIC SURGERY

## 2020-09-01 RX ORDER — LIDOCAINE HYDROCHLORIDE 10 MG/ML
5 INJECTION, SOLUTION INFILTRATION; PERINEURAL
Status: SHIPPED | OUTPATIENT
Start: 2020-09-01

## 2020-09-01 RX ORDER — METHYLPREDNISOLONE ACETATE 40 MG/ML
80 INJECTION, SUSPENSION INTRA-ARTICULAR; INTRALESIONAL; INTRAMUSCULAR; SOFT TISSUE
Status: SHIPPED | OUTPATIENT
Start: 2020-09-01

## 2020-09-01 RX ADMIN — LIDOCAINE HYDROCHLORIDE 5 ML: 10 INJECTION, SOLUTION INFILTRATION; PERINEURAL at 16:51

## 2020-09-01 RX ADMIN — METHYLPREDNISOLONE ACETATE 80 MG: 40 INJECTION, SUSPENSION INTRA-ARTICULAR; INTRALESIONAL; INTRAMUSCULAR; SOFT TISSUE at 16:51

## 2020-09-01 ASSESSMENT — MIFFLIN-ST. JEOR: SCORE: 1406.19

## 2020-09-01 NOTE — LETTER
9/1/2020         RE: Leslie Villanueva  [address] [place] [address]        Dear Colleague,    Thank you for referring your patient, Leslie Villanueva, to the [facility]THOP[facility] SUR[facility]ERY. Please see a copy of my visit note below.    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 Patient states that her symptoms have not changed since her last appt. She thought for a little bit that physical therapy was helping but then eventually just went back to the non stop pain that she had previously. She does feel like at this time her pain is all anterior in the right shoulder, deep within the joint. Symptoms continue to be worsened by looking at her watch and at night time when lying down. She had a right subacromial corticosteroid injection in August of 2019 with moderate relief lasting 4-5 months. She has failed chiroproactic care, cyclobenzaprine, and ibuprofen TID. Physical therapy has not made any substantial improvements. She completed the course of Meloxicam and did not find it helpful. No other concerns or complaints at this time.    REVIEW OF SYSTEMS:  General: Denies fevers, chills, or night sweats.  Skin: Denies rashes or lesions.  Head: Denies headache or dizziness.  Eyes: Denies vision changes or eye pain.  Ears: Denies ear pain or decreased hearing.  Nose: Denies nose bleeding or sinus pain.  Mouth & Throat: Denies bleeding gums or sore throat.  Neck: Denies neck pain or stiffness.  Respiratory: Denies cough, wheezing, sob, or hemoptysis.  Cardiovascular: Denies chest pain, chest pressure, or palpitations.   Gastrointestinal: Denies abdominal pain, nausea, vomiting, diarrhea, or constipation.  Genitourinary: Denies difficulty or pain with urination.  "  Musculoskeletal: As noted above in the HPI, otherwise normal.  Neuro: Denies paralysis, weakness, paresthesias, or speech difficulty.  Lymphatics: Denies lymphadenopathy.  Psych: Denies anxiety, sadness, or irritability.    PHYSICAL EXAM:  Patient weighs 148 lbs 0 oz. Ht 1.702 m (5' 7\")   Wt 67.1 kg (148 lb)   BMI 23.18 kg/m    Constitutional: Well-developed, well-nourished, healthy appearing female.  Skin: Warm, dry, and without rashes.   HEENT: Normal.  Cardiac: Well perfused extremities, strong 2+ peripheral pulses. No edema.   Pulmonary: Non-labored respirations on room air without audible wheezes.   Abdomen: Soft, nontender.  Musculoskeletal: Patient ambulates with a slow, symmetric, and steady gait. Active range of motion of the right shoulder is 170/95/70/T10 compared to 170/95/70/T6 on the left. Right shoulder has a markedly positive Active compression test, Modified dynamic labral shear test, and minimal scapular dyskinesis but no winging; all negative on the left shoulder. Full symmetric and painless cuff strength. Bilateral shoulders with 2+ anterior load and shift, 1+ posterior load and shift. Equivocal Jerk/Yudith test on the right, normal on the left. No AC, SC, biceps signs, cross-body adduction, Nisreen, scars, atrophy, deformity, belly-press, lift-off, external rotation lag sign, internal rotation lag sign, hornblower's bilaterally. No generalized ligamentous laxity. Neurovascular exam and cervical spine exam are normal.    IMAGING:   MRI of the right shoulder is notable for a area of subacromial bursitis, and a poorly defined posterior superior labral tear.  No other notable findings on the MRI.    IMPRESSION: 34 year old-year-old left hand dominant female, with a right shoulder posterosuperior labral tear.    PLAN:   I had a long conversation with Leslie today.  At this time, she has failed all non-operative modalities that I can offer except for an intra-articular corticosteroid injection. I feel " that this is the next logical step. The risks, benefits, and alternatives to a right shoulder glenohumeral joint corticosteroid injection were discussed at length with the patient. She elected to proceed. Signed informed consent obtained. Procedure performed without complication and she tolerated it very well. A few minutes later, she stated that she had complete relief of her pain. She should continue with her physical therapy and return to clinic in 3-4 months as needed if her pain symptoms return. Sooner if she experiences no benefit with this injection.     At the conclusion of the office visit, Leslie verbally acknowledged that I answered all of her questions satisfactorily.    Large Joint Injection/Arthocentesis: R glenohumeral joint    Date/Time: 9/1/2020 4:51 PM  Performed by: Scottie Yap MD  Authorized by: Scottie Yap MD     Indications:  Pain  Needle Size:  22 G  Guidance: ultrasound    Approach:  Posterolateral  Location:  Shoulder      Site:  R glenohumeral joint  Medications:  5 mL lidocaine 1 %; 80 mg methylPREDNISolone 40 MG/ML  Outcome:  Tolerated well, no immediate complications  Procedure discussed: discussed risks, benefits, and alternatives    Consent Given by:  Patient  Timeout: timeout called immediately prior to procedure    Prep: patient was prepped and draped in usual sterile fashion     8 mL's of Lidocaine were administered in the injection          Again, thank you for allowing me to participate in the care of your patient.        Sincerely,        Scottie Yap MD

## 2020-09-01 NOTE — PROGRESS NOTES
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 Patient states that her symptoms have not changed since her last appt. She thought for a little bit that physical therapy was helping but then eventually just went back to the non stop pain that she had previously. She does feel like at this time her pain is all anterior in the right shoulder, deep within the joint. Symptoms continue to be worsened by looking at her watch and at night time when lying down. She had a right subacromial corticosteroid injection in August of 2019 with moderate relief lasting 4-5 months. She has failed chiroproactic care, cyclobenzaprine, and ibuprofen TID. Physical therapy has not made any substantial improvements. She completed the course of Meloxicam and did not find it helpful. No other concerns or complaints at this time.    REVIEW OF SYSTEMS:  General: Denies fevers, chills, or night sweats.  Skin: Denies rashes or lesions.  Head: Denies headache or dizziness.  Eyes: Denies vision changes or eye pain.  Ears: Denies ear pain or decreased hearing.  Nose: Denies nose bleeding or sinus pain.  Mouth & Throat: Denies bleeding gums or sore throat.  Neck: Denies neck pain or stiffness.  Respiratory: Denies cough, wheezing, sob, or hemoptysis.  Cardiovascular: Denies chest pain, chest pressure, or palpitations.   Gastrointestinal: Denies abdominal pain, nausea, vomiting, diarrhea, or constipation.  Genitourinary: Denies difficulty or pain with urination.   Musculoskeletal: As noted above in the HPI, otherwise normal.  Neuro: Denies paralysis, weakness, paresthesias, or speech difficulty.  Lymphatics: Denies lymphadenopathy.  Psych: Denies anxiety, sadness, or irritability.    PHYSICAL  "EXAM:  Patient weighs 148 lbs 0 oz. Ht 1.702 m (5' 7\")   Wt 67.1 kg (148 lb)   BMI 23.18 kg/m    Constitutional: Well-developed, well-nourished, healthy appearing female.  Skin: Warm, dry, and without rashes.   HEENT: Normal.  Cardiac: Well perfused extremities, strong 2+ peripheral pulses. No edema.   Pulmonary: Non-labored respirations on room air without audible wheezes.   Abdomen: Soft, nontender.  Musculoskeletal: Patient ambulates with a slow, symmetric, and steady gait. Active range of motion of the right shoulder is 170/95/70/T10 compared to 170/95/70/T6 on the left. Right shoulder has a markedly positive Active compression test, Modified dynamic labral shear test, and minimal scapular dyskinesis but no winging; all negative on the left shoulder. Full symmetric and painless cuff strength. Bilateral shoulders with 2+ anterior load and shift, 1+ posterior load and shift. Equivocal Jerk/Yudith test on the right, normal on the left. No AC, SC, biceps signs, cross-body adduction, Nisreen, scars, atrophy, deformity, belly-press, lift-off, external rotation lag sign, internal rotation lag sign, hornblower's bilaterally. No generalized ligamentous laxity. Neurovascular exam and cervical spine exam are normal.    IMAGING:   MRI of the right shoulder is notable for a area of subacromial bursitis, and a poorly defined posterior superior labral tear.  No other notable findings on the MRI.    IMPRESSION: 34 year old-year-old left hand dominant female, with a right shoulder posterosuperior labral tear.    PLAN:   I had a long conversation with Leslie today.  At this time, she has failed all non-operative modalities that I can offer except for an intra-articular corticosteroid injection. I feel that this is the next logical step. The risks, benefits, and alternatives to a right shoulder glenohumeral joint corticosteroid injection were discussed at length with the patient. She elected to proceed. Signed informed consent " obtained. Procedure performed without complication and she tolerated it very well. A few minutes later, she stated that she had complete relief of her pain. She should continue with her physical therapy and return to clinic in 3-4 months as needed if her pain symptoms return. Sooner if she experiences no benefit with this injection.     At the conclusion of the office visit, Leslie verbally acknowledged that I answered all of her questions satisfactorily.    Large Joint Injection/Arthocentesis: R glenohumeral joint    Date/Time: 9/1/2020 4:51 PM  Performed by: Scottie Yap MD  Authorized by: Scottie Yap MD     Indications:  Pain  Needle Size:  22 G  Guidance: ultrasound    Approach:  Posterolateral  Location:  Shoulder      Site:  R glenohumeral joint  Medications:  5 mL lidocaine 1 %; 80 mg methylPREDNISolone 40 MG/ML  Outcome:  Tolerated well, no immediate complications  Procedure discussed: discussed risks, benefits, and alternatives    Consent Given by:  Patient  Timeout: timeout called immediately prior to procedure    Prep: patient was prepped and draped in usual sterile fashion     8 mL's of Lidocaine were administered in the injection

## 2020-09-01 NOTE — PATIENT INSTRUCTIONS
Steroid injection of the right shoulder: intra-articular  was performed today in clinic    - Would not soak in a hot tub, bath or swimming pool for 48 hours  - Ok to shower  - Ice today and only do your normal amounts of activity  - The lidocaine (what is giving you pain relief right now) will likely stop working in 1-2 hours.  You will then have pain again, similar to before you received the injection. The corticosteroid will not start working until approximately 1-2 weeks from now.  In a small percentage of people, cortisone can cause flushing/redness in the face. This usually lasts for 1-3 days and resolves. Cool compress and Ibuprofen/Tylenol can help if this happens.    Continue Physical Therapy     Follow up with Dr. Yap as needed    Call my office with any questions or concerns, [phone].

## 2020-09-08 ENCOUNTER — TELEPHONE (OUTPATIENT)
Dept: ORTHOPEDICS | Facility: CLINIC | Age: 35
End: 2020-09-08

## 2020-09-08 NOTE — TELEPHONE ENCOUNTER
BRIEF NOTE:    I called Leslie to follow-up on the results from her right shoulder glenohumeral corticosteroid injection from last week.  She notes 85% relief of her pain with only mild residual symptoms.  She is very pleased with this.  She feels that she is better able to engage with her physical therapy exercises.  She was appreciative of the call.  She will follow-up with me on an as-needed basis moving forward.

## 2020-11-12 ENCOUNTER — MYC MEDICAL ADVICE (OUTPATIENT)
Dept: FAMILY MEDICINE | Facility: CLINIC | Age: 35
End: 2020-11-12

## 2020-11-12 DIAGNOSIS — F33.1 MODERATE EPISODE OF RECURRENT MAJOR DEPRESSIVE DISORDER (H): Primary | ICD-10-CM

## 2020-11-12 RX ORDER — VENLAFAXINE HYDROCHLORIDE 37.5 MG/1
37.5 CAPSULE, EXTENDED RELEASE ORAL DAILY
Qty: 7 CAPSULE | Refills: 0 | Status: SHIPPED | OUTPATIENT
Start: 2020-11-12 | End: 2020-12-18

## 2020-11-12 RX ORDER — CITALOPRAM HYDROBROMIDE 20 MG/1
20 TABLET ORAL DAILY
Qty: 7 TABLET | Refills: 0 | Status: SHIPPED | OUTPATIENT
Start: 2020-11-12 | End: 2020-12-18

## 2020-11-12 RX ORDER — CITALOPRAM HYDROBROMIDE 10 MG/1
10 TABLET ORAL DAILY
Qty: 7 TABLET | Refills: 0 | Status: SHIPPED | OUTPATIENT
Start: 2020-11-12 | End: 2020-12-18

## 2020-11-12 RX ORDER — VENLAFAXINE HYDROCHLORIDE 150 MG/1
150 CAPSULE, EXTENDED RELEASE ORAL DAILY
Qty: 30 CAPSULE | Refills: 0 | Status: SHIPPED | OUTPATIENT
Start: 2020-11-12 | End: 2020-12-18

## 2020-11-12 RX ORDER — VENLAFAXINE HYDROCHLORIDE 75 MG/1
75 CAPSULE, EXTENDED RELEASE ORAL DAILY
Qty: 7 CAPSULE | Refills: 0 | Status: SHIPPED | OUTPATIENT
Start: 2020-11-12 | End: 2020-12-18

## 2020-11-12 NOTE — TELEPHONE ENCOUNTER
Please see curated.by message and advise.      Thank you,  Юлия JESUSRN BSN  [facility]  [phone]

## 2020-11-12 NOTE — TELEPHONE ENCOUNTER
If changing to venlafaxine, need cross tapering as below.    1st week: Citalopram 20mg daily / Venlafaxine XR 37.5mg daily    2nd week: Citalopram 10mg daily / Venlafaxine XR 75mg daily     3rd week: no Citalopram / Venlafaxine XR 150mg daily thereafter     Then, I would have her to f/u visit with me in 4th week     Side effect) she could have drowsiness and temporary appetite loss and stomach discomfort. She could have increase blood pressure above 150mg      If she wants to try, I will release the prescription   thx

## 2020-11-12 NOTE — TELEPHONE ENCOUNTER
Please review my chart message and advise.    Pt has updated PHQ 9 for panel management    PHQ-9 SCORE 11/12/2020   PHQ-9 Total Score MyChart 10 (Moderate depression)   PHQ-9 Total Score 10     LAURO 7 score 13    Hilda DONALD RN  EP Triage

## 2020-11-12 NOTE — TELEPHONE ENCOUNTER
Since she is already on the maximum dose of Citalopram, I may consider 1) to switch to SNRI like venlafaxine or desvenlafaxine, or 2) adding Wellbutrin with lowering dose of Citalopram down to 20mg     Or, we could have her to see psychologist for behavioral cognitive therapy without changing her current regiment.     Please check on with her

## 2020-12-09 DIAGNOSIS — F33.1 MODERATE EPISODE OF RECURRENT MAJOR DEPRESSIVE DISORDER (H): ICD-10-CM

## 2020-12-09 NOTE — TELEPHONE ENCOUNTER
Routing refill request to provider for review/approval because:  Labs out of range:  phq9    DEA ThomasN, RN  [employer] Triage

## 2020-12-09 NOTE — TELEPHONE ENCOUNTER
Please have her to do med check virtual visit as I suggested last month(copied below).      If changing to venlafaxine, need cross tapering as below.    1st week: Citalopram 20mg daily / Venlafaxine XR 37.5mg daily     2nd week: Citalopram 10mg daily / Venlafaxine XR 75mg daily      3rd week: no Citalopram / Venlafaxine XR 150mg daily thereafter      Then, I would have her to f/u visit with me in 4th week

## 2020-12-14 ENCOUNTER — MYC MEDICAL ADVICE (OUTPATIENT)
Dept: FAMILY MEDICINE | Facility: CLINIC | Age: 35
End: 2020-12-14

## 2020-12-17 PROBLEM — S43.431A TEAR OF RIGHT GLENOID LABRUM: Status: RESOLVED | Noted: 2020-06-10 | Resolved: 2020-12-17

## 2020-12-17 PROBLEM — G25.89 SCAPULAR DYSKINESIS: Status: RESOLVED | Noted: 2020-06-10 | Resolved: 2020-12-17

## 2020-12-17 NOTE — PROGRESS NOTES
Discharge Note    Progress reporting period is from initial evaluation date (please see noted date below) to Aug 14, 2020.  Linked Episodes   Type: Episode: Status: Noted: Resolved: Last update: Updated by:   PHYSICAL THERAPY R sh scap dys, labral tear Active 6/10/2020  8/14/2020  8:27 AM Martin Hough, PT      Comments:       Leslie failed to follow up and current status is unknown.  Please see information below for last relevant information on current status.  Patient seen for 5 visits.    SUBJECTIVE  Subjective changes noted by patient:     .  Current pain level is  .     Previous pain level was   .   Changes in function:  Yes (See Goal flowsheet attached for changes in current functional level)  Adverse reaction to treatment or activity: None    OBJECTIVE  Changes noted in objective findings:       ASSESSMENT/PLAN  Diagnosis: R sh scap dys, labral tear   Updated problem list and treatment plan:   Pain - HEP  STG/LTGs have been met or progress has been made towards goals:  Yes, please see goal flowsheet for most current information  Assessment of Progress: current status is unknown.    Last current status: Pt is progressing slower than anticipated   Self Management Plans:  HEP  I have re-evaluated this patient and find that the nature, scope, duration and intensity of the therapy is appropriate for the medical condition of the patient.  Leslie continues to require the following intervention to meet STG and LTG's:  HEP.    Recommendations:  Discharge with current home program.  Patient to follow up with MD as needed.    Please refer to the daily flowsheet for treatment today, total treatment time and time spent performing 1:1 timed codes.

## 2020-12-18 ENCOUNTER — VIRTUAL VISIT (OUTPATIENT)
Dept: FAMILY MEDICINE | Facility: CLINIC | Age: 35
End: 2020-12-18
Payer: COMMERCIAL

## 2020-12-18 DIAGNOSIS — L70.0 ACNE VULGARIS: Primary | ICD-10-CM

## 2020-12-18 DIAGNOSIS — F33.1 MODERATE EPISODE OF RECURRENT MAJOR DEPRESSIVE DISORDER (H): ICD-10-CM

## 2020-12-18 PROCEDURE — 99214 OFFICE O/P EST MOD 30 MIN: CPT | Mod: 95 | Performed by: FAMILY MEDICINE

## 2020-12-18 RX ORDER — VENLAFAXINE HYDROCHLORIDE 150 MG/1
150 CAPSULE, EXTENDED RELEASE ORAL DAILY
Qty: 30 CAPSULE | Refills: 5 | Status: SHIPPED | OUTPATIENT
Start: 2020-12-18 | End: 2021-11-21

## 2020-12-18 RX ORDER — SPIRONOLACTONE 100 MG/1
100 TABLET, FILM COATED ORAL DAILY
Qty: 30 TABLET | Refills: 5 | Status: SHIPPED | OUTPATIENT
Start: 2020-12-18 | End: 2021-06-18

## 2020-12-18 NOTE — PROGRESS NOTES
"Leslie Villanueva is a 35 year old female who is being evaluated via a billable video visit.      The patient has been notified of following:     \"This video visit will be conducted via a call between you and your physician/provider. We have found that certain health care needs can be provided without the need for an in-person physical exam.  This service lets us provide the care you need with a video conversation.  If a prescription is necessary we can send it directly to your pharmacy.  If lab work is needed we can place an order for that and you can then stop by our lab to have the test done at a later time.    Video visits are billed at different rates depending on your insurance coverage.  Please reach out to your insurance provider with any questions.    If during the course of the call the physician/provider feels a video visit is not appropriate, you will not be charged for this service.\"    Patient has given verbal consent for Video visit? Yes  How would you like to obtain your AVS? MyChart  If you are dropped from the video visit, the video invite should be resent to: Text to cell phone: [phone]  Will anyone else be joining your video visit? No      Subjective     Leslie Villanueva is a 35 year old female who presents today via video visit for the following health issues:    HPI     Medication Followup of Effexor     Taking Medication as prescribed: yes    Side Effects:  None    Medication Helping Symptoms:  Yes medication is helping        Video Start Time: 11:55        Review of Systems   Constitutional, HEENT, cardiovascular, pulmonary, gi and gu systems are negative, except as otherwise noted.      Objective           Vitals:  No vitals were obtained today due to virtual visit.    Physical Exam     GENERAL: Healthy, alert and no distress  EYES: Eyes grossly normal to inspection.  No discharge or erythema, or obvious scleral/conjunctival abnormalities.  RESP: No audible wheeze, cough, or visible " cyanosis.  No visible retractions or increased work of breathing.    SKIN: Visible skin clear. No significant rash, abnormal pigmentation or lesions.  NEURO: Cranial nerves grossly intact.  Mentation and speech appropriate for age.  PSYCH: Mentation appears normal, affect normal/bright, judgement and insight intact, normal speech and appearance well-groomed.              Assessment & Plan     Moderate episode of recurrent major depressive disorder (H)  Stable with current dose of meds, has no side effect from the meds, will keep monitoring   - venlafaxine (EFFEXOR-XR) 150 MG 24 hr capsule; Take 1 capsule (150 mg) by mouth daily    Acne vulgaris  Not improving, will have her to try 100 mg spironolactone with clindamycin   - spironolactone (ALDACTONE) 100 MG tablet; Take 1 tablet (100 mg) by mouth daily        FUTURE APPOINTMENTS:       - Follow-up visit in 6 months     No follow-ups on file.    Jeffrey Lambert MD  [facility] [place]      Video-Visit Details    Type of service:  Video Visit    Video End Time:12:21 PM    Originating Location (pt. Location): Home    Distant Location (provider location):  [email or web address]     Platform used for Video Visit: [employer]

## 2020-12-21 RX ORDER — VENLAFAXINE HYDROCHLORIDE 150 MG/1
CAPSULE, EXTENDED RELEASE ORAL
Qty: 30 CAPSULE | Refills: 0 | OUTPATIENT
Start: 2020-12-21

## 2021-01-26 DIAGNOSIS — L70.0 ACNE VULGARIS: ICD-10-CM

## 2021-01-27 RX ORDER — CLINDAMYCIN PHOSPHATE 10 MG/G
GEL TOPICAL 2 TIMES DAILY
Qty: 30 G | Refills: 3 | Status: SHIPPED | OUTPATIENT
Start: 2021-01-27 | End: 2021-11-21

## 2021-01-27 NOTE — TELEPHONE ENCOUNTER
Prescription approved per [facility] Refill Protocol.    Hilda DONALD RN  EP Triage

## 2021-02-09 ENCOUNTER — IMMUNIZATION (OUTPATIENT)
Dept: NURSING | Facility: CLINIC | Age: 36
End: 2021-02-09
Payer: COMMERCIAL

## 2021-02-09 PROCEDURE — 91300 PR COVID VAC PFIZER DIL RECON 30 MCG/0.3 ML IM: CPT

## 2021-02-09 PROCEDURE — 0001A PR COVID VAC PFIZER DIL RECON 30 MCG/0.3 ML IM: CPT

## 2021-03-02 ENCOUNTER — IMMUNIZATION (OUTPATIENT)
Dept: NURSING | Facility: CLINIC | Age: 36
End: 2021-03-02
Attending: INTERNAL MEDICINE
Payer: COMMERCIAL

## 2021-03-02 PROCEDURE — 91300 PR COVID VAC PFIZER DIL RECON 30 MCG/0.3 ML IM: CPT

## 2021-03-02 PROCEDURE — 0002A PR COVID VAC PFIZER DIL RECON 30 MCG/0.3 ML IM: CPT

## 2021-04-25 ENCOUNTER — HEALTH MAINTENANCE LETTER (OUTPATIENT)
Age: 36
End: 2021-04-25

## 2021-05-06 ENCOUNTER — MYC MEDICAL ADVICE (OUTPATIENT)
Dept: FAMILY MEDICINE | Facility: CLINIC | Age: 36
End: 2021-05-06

## 2021-05-06 DIAGNOSIS — F33.1 MODERATE EPISODE OF RECURRENT MAJOR DEPRESSIVE DISORDER (H): Primary | ICD-10-CM

## 2021-05-06 RX ORDER — VENLAFAXINE 75 MG/1
TABLET ORAL
Qty: 35 TABLET | Refills: 0 | Status: SHIPPED | OUTPATIENT
Start: 2021-05-06 | End: 2021-11-21

## 2021-05-06 NOTE — TELEPHONE ENCOUNTER
I sent Effexor taper dosing prescription to pharmacy. Please have her to  and follow the direction for next 30 days, then update us    thx

## 2021-05-06 NOTE — TELEPHONE ENCOUNTER
Result message given from Dr. Lambert. Pt states she will start effexor taper and keep us updated on how she is feeling. Pt verbalized understanding, all questions answered.     Vee Dhaliwal RN

## 2021-06-18 DIAGNOSIS — L70.0 ACNE VULGARIS: Primary | ICD-10-CM

## 2021-06-18 RX ORDER — SPIRONOLACTONE 100 MG/1
TABLET, FILM COATED ORAL
Qty: 30 TABLET | Refills: 5 | Status: SHIPPED | OUTPATIENT
Start: 2021-06-18 | End: 2021-11-21

## 2021-06-18 NOTE — TELEPHONE ENCOUNTER
Should do check renal function and potassium, future order is placed.  Please have her to schedule lab only visit  thx

## 2021-06-18 NOTE — TELEPHONE ENCOUNTER
Routing refill request to provider for review/approval because:  Labs not current:  Cr, K+, and NA no record on file    Hilda DONALD RN  EP Triage

## 2021-10-10 ENCOUNTER — HEALTH MAINTENANCE LETTER (OUTPATIENT)
Age: 36
End: 2021-10-10

## 2021-10-11 ENCOUNTER — MYC MEDICAL ADVICE (OUTPATIENT)
Dept: FAMILY MEDICINE | Facility: CLINIC | Age: 36
End: 2021-10-11

## 2021-10-11 ENCOUNTER — HOSPITAL ENCOUNTER (EMERGENCY)
Facility: CLINIC | Age: 36
Discharge: HOME | End: 2021-10-11
Attending: NURSE PRACTITIONER | Admitting: NURSE PRACTITIONER
Payer: COMMERCIAL

## 2021-10-11 ENCOUNTER — APPOINTMENT (OUTPATIENT)
Dept: GENERAL RADIOLOGY | Facility: CLINIC | Age: 36
End: 2021-10-11
Attending: NURSE PRACTITIONER
Payer: COMMERCIAL

## 2021-10-11 ENCOUNTER — NURSE TRIAGE (OUTPATIENT)
Dept: FAMILY MEDICINE | Facility: CLINIC | Age: 36
End: 2021-10-11

## 2021-10-11 VITALS
WEIGHT: 151.8 LBS | TEMPERATURE: 98.7 F | BODY MASS INDEX: 23.78 KG/M2 | HEART RATE: 104 BPM | DIASTOLIC BLOOD PRESSURE: 91 MMHG | RESPIRATION RATE: 20 BRPM | SYSTOLIC BLOOD PRESSURE: 132 MMHG | OXYGEN SATURATION: 98 %

## 2021-10-11 DIAGNOSIS — B34.9 VIRAL ILLNESS: ICD-10-CM

## 2021-10-11 LAB
ANION GAP SERPL CALCULATED.3IONS-SCNC: 8 MMOL/L (ref 3–14)
BASOPHILS # BLD AUTO: 0 10E3/UL (ref 0–0.2)
BASOPHILS NFR BLD AUTO: 0 %
BUN SERPL-MCNC: 8 MG/DL (ref 7–30)
CALCIUM SERPL-MCNC: 9.2 MG/DL (ref 8.5–10.1)
CHLORIDE BLD-SCNC: 105 MMOL/L (ref 94–109)
CO2 SERPL-SCNC: 25 MMOL/L (ref 20–32)
CREAT SERPL-MCNC: 0.53 MG/DL (ref 0.52–1.04)
EOSINOPHIL # BLD AUTO: 0 10E3/UL (ref 0–0.7)
EOSINOPHIL NFR BLD AUTO: 0 %
ERYTHROCYTE [DISTWIDTH] IN BLOOD BY AUTOMATED COUNT: 13.1 % (ref 10–15)
GFR SERPL CREATININE-BSD FRML MDRD: >90 ML/MIN/1.73M2
GLUCOSE BLD-MCNC: 110 MG/DL (ref 70–99)
HCG SERPL QL: NEGATIVE
HCT VFR BLD AUTO: 41.2 % (ref 35–47)
HGB BLD-MCNC: 14.2 G/DL (ref 11.7–15.7)
HOLD SPECIMEN: NORMAL
IMM GRANULOCYTES # BLD: 0 10E3/UL
IMM GRANULOCYTES NFR BLD: 0 %
LYMPHOCYTES # BLD AUTO: 2.2 10E3/UL (ref 0.8–5.3)
LYMPHOCYTES NFR BLD AUTO: 20 %
MCH RBC QN AUTO: 30.1 PG (ref 26.5–33)
MCHC RBC AUTO-ENTMCNC: 34.5 G/DL (ref 31.5–36.5)
MCV RBC AUTO: 87 FL (ref 78–100)
MONOCYTES # BLD AUTO: 0.7 10E3/UL (ref 0–1.3)
MONOCYTES NFR BLD AUTO: 6 %
NEUTROPHILS # BLD AUTO: 7.7 10E3/UL (ref 1.6–8.3)
NEUTROPHILS NFR BLD AUTO: 74 %
NRBC # BLD AUTO: 0 10E3/UL
NRBC BLD AUTO-RTO: 0 /100
PLATELET # BLD AUTO: 326 10E3/UL (ref 150–450)
POTASSIUM BLD-SCNC: 3.7 MMOL/L (ref 3.4–5.3)
RBC # BLD AUTO: 4.72 10E6/UL (ref 3.8–5.2)
SODIUM SERPL-SCNC: 138 MMOL/L (ref 133–144)
WBC # BLD AUTO: 10.6 10E3/UL (ref 4–11)

## 2021-10-11 PROCEDURE — 93005 ELECTROCARDIOGRAM TRACING: CPT

## 2021-10-11 PROCEDURE — 36415 COLL VENOUS BLD VENIPUNCTURE: CPT | Performed by: EMERGENCY MEDICINE

## 2021-10-11 PROCEDURE — 80048 BASIC METABOLIC PNL TOTAL CA: CPT | Performed by: EMERGENCY MEDICINE

## 2021-10-11 PROCEDURE — 84703 CHORIONIC GONADOTROPIN ASSAY: CPT | Performed by: EMERGENCY MEDICINE

## 2021-10-11 PROCEDURE — 85025 COMPLETE CBC W/AUTO DIFF WBC: CPT | Performed by: EMERGENCY MEDICINE

## 2021-10-11 PROCEDURE — 99285 EMERGENCY DEPT VISIT HI MDM: CPT | Mod: 25

## 2021-10-11 PROCEDURE — 71046 X-RAY EXAM CHEST 2 VIEWS: CPT

## 2021-10-11 RX ORDER — BENZONATATE 200 MG/1
200 CAPSULE ORAL 3 TIMES DAILY PRN
Qty: 15 CAPSULE | Refills: 0 | Status: SHIPPED | OUTPATIENT
Start: 2021-10-11 | End: 2021-10-11

## 2021-10-11 ASSESSMENT — ENCOUNTER SYMPTOMS
DYSURIA: 0
VOMITING: 0
SHORTNESS OF BREATH: 1
NAUSEA: 0
HEADACHES: 0
COUGH: 1
ABDOMINAL PAIN: 0

## 2021-10-11 NOTE — ED TRIAGE NOTES
Pt arrives reporting increased SOB with cough, PT reports that she received a rapid COVID test last week and this was negative. PT reports that she feels palpitations with coughing as well. PT VSS and ABC's intact. PT has dry cough during triage

## 2021-10-11 NOTE — TELEPHONE ENCOUNTER
Reason for Disposition    Continuous (nonstop) coughing interferes with work or school and no improvement using cough treatment per Care Advice    Additional Information    Negative: Bluish (or gray) lips or face    Negative: Severe difficulty breathing (e.g., struggling for each breath, speaks in single words)    Negative: Rapid onset of cough and has hives    Negative: Coughing started suddenly after medicine, an allergic food or bee sting    Negative: Difficulty breathing after exposure to flames, smoke, or fumes    Negative: Sounds like a life-threatening emergency to the triager    Negative: Previous asthma attacks and this feels like asthma attack    Negative: Chest pain present when not coughing    Negative: Difficulty breathing    Negative: Passed out (i.e., fainted, collapsed and was not responding)    Negative: Patient sounds very sick or weak to the triager    Negative: Coughed up > 1 tablespoon (15 ml) blood (Exception: blood-tinged sputum)    Negative: Fever > 103 F (39.4 C)    Negative: Fever > 101 F (38.3 C) and over 60 years of age    Negative: Fever > 100.0 F (37.8 C) and has diabetes mellitus or a weak immune system (e.g., HIV positive, cancer chemotherapy, organ transplant, splenectomy, chronic steroids)    Negative: Fever > 100.0 F (37.8 C) and bedridden (e.g., nursing home patient, stroke, chronic illness, recovering from surgery)    Negative: Increasing ankle swelling    Negative: Wheezing is present    Negative: SEVERE coughing spells (e.g., whooping sound after coughing, vomiting after coughing)    Negative: Coughing up stefany-colored (reddish-brown) or blood-tinged sputum    Negative: Fever present > 3 days (72 hours)    Negative: Fever returns after gone for over 24 hours and symptoms worse or not improved    Negative: Using nasal washes and pain medicine > 24 hours and sinus pain persists    Negative: Known COPD or other severe lung disease (i.e., bronchiectasis, cystic fibrosis, lung  "surgery) and worsening symptoms (i.e., increased sputum purulence or amount, increased breathing difficulty)    Answer Assessment - Initial Assessment Questions  1. ONSET: \"When did the cough begin?\"       Tuesday 10/5.  covid test negative on 10/7  2. SEVERITY: \"How bad is the cough today?\"       Cough is dry and gets coughing spells  3. RESPIRATORY DISTRESS: \"Describe your breathing.\"        Hard to breathe when coughing so hard.  Hard to catch breath  4. FEVER: \"Do you have a fever?\" If so, ask: \"What is your temperature, how was it measured, and when did it start?\"      No fever  5. HEMOPTYSIS: \"Are you coughing up any blood?\" If so ask: \"How much?\" (flecks, streaks, tablespoons, etc.)      no  6. TREATMENT: \"What have you done so far to treat the cough?\" (e.g., meds, fluids, humidifier)      Mucinex, prednisone, and tessalon perrles  7. CARDIAC HISTORY: \"Do you have any history of heart disease?\" (e.g., heart attack, congestive heart failure)       no  8. LUNG HISTORY: \"Do you have any history of lung disease?\"  (e.g., pulmonary embolus, asthma, emphysema)      no  9. PE RISK FACTORS: \"Do you have a history of blood clots?\" (or: recent major surgery, recent prolonged travel, bedridden)      no  10. OTHER SYMPTOMS: \"Do you have any other symptoms? (e.g., runny nose, wheezing, chest pain)        no  11. PREGNANCY: \"Is there any chance you are pregnant?\" \"When was your last menstrual period?\"        no  12. TRAVEL: \"Have you traveled out of the country in the last month?\" (e.g., travel history, exposures)        no    Protocols used: COUGH-A-OH    SEE TODAY OR TOMORROW IN OFFICE: You need to be examined. Let me give you an appointment.      CALL BACK IF:  * Difficulty breathing occurs  * Fever lasts more than 3 days   * Nasal discharge lasts more than 10 days   * Cough lasts more than 3 weeks   * You become worse        Patient/Caregiver understands and will follow care advice? Yes, plans to follow advice     Pt " is in the ED at [facility] now and no openings in clinic today or tomorrow.  Pt will stay at ED for evaluation.    Hilda DONALD RN  EP Triage

## 2021-10-12 LAB
ATRIAL RATE - MUSE: 70 BPM
DIASTOLIC BLOOD PRESSURE - MUSE: NORMAL MMHG
INTERPRETATION ECG - MUSE: NORMAL
P AXIS - MUSE: 63 DEGREES
PR INTERVAL - MUSE: 146 MS
QRS DURATION - MUSE: 88 MS
QT - MUSE: 364 MS
QTC - MUSE: 393 MS
R AXIS - MUSE: 67 DEGREES
SYSTOLIC BLOOD PRESSURE - MUSE: NORMAL MMHG
T AXIS - MUSE: 55 DEGREES
VENTRICULAR RATE- MUSE: 70 BPM

## 2021-10-12 NOTE — DISCHARGE INSTRUCTIONS
I do recommend you try over-the-counter symptomatic relief medications such as throat lozenges/cough drops.  DayQuil/NyQuil or their generic equivalents.  Also Vicks VapoRub can help.  Again for the allergy component you may want to take a over-the-counter allergy medicine such as Zyrtec, Allegra, or Xyzal.

## 2021-10-12 NOTE — ED PROVIDER NOTES
"  History     Chief Complaint:  Shortness of Breath and Palpitations      HPI   Leslie Villanueva is a 36 year old female who presents with upper respiratory symptoms.  Symptoms have been ongoing for approximately 1 week.  She did have a Covid test last week which was negative.  Due to increasing feelings of shortness of breath and occasional palpitations as well as chest discomfort with coughing she presented today for evaluation.  She said she has a history of cold or exercise-induced asthma and initiate a \"nurse friend of mine\" gave me a nebulizer which seemed to help at first but that has stopped helping her therefore she was concerned and presented for evaluation.  She denies any significant nasal congestion.  She has had no nausea or vomiting.  Her main concern is for her cough.    Review of Systems   Respiratory: Positive for cough and shortness of breath.    Cardiovascular: Positive for chest pain.   Gastrointestinal: Negative for abdominal pain, nausea and vomiting.   Genitourinary: Negative for dysuria.   Neurological: Negative for headaches.   All other systems reviewed and are negative.      Allergies:  No Known Allergies      Medications:    Aldactone  Effexor    Past Medical History:    The patient denies past medical history.      Social History:  Presents alone  Denies smoking    Physical Exam     Patient Vitals for the past 24 hrs:   BP Temp Temp src Pulse Resp SpO2 Weight   10/11/21 2032 -- -- -- -- -- -- 68.9 kg (151 lb 12.8 oz)   10/11/21 1522 (!) 132/91 98.7  F (37.1  C) Oral 104 20 98 % --       Physical Exam  General: Alert, Mild  discomfort, well kept  Eyes: PERRL, conjunctivae pink no scleral icterus or conjunctival injection  ENT:   Moist mucus membranes, posterior oropharynx clear without erythema or exudates, No lymphadenopathy, Normal voice  Resp:  Lungs clear to auscultation bilaterally, no crackles/rubs/wheezes. Good air movement  CV:  Normal rate and rhythm, no murmurs/rubs/gallops  GI: "  Abdomen soft and non-distended.  Normoactive BS.  No tenderness, guarding or rebound, No masses  Skin:  Warm, dry.  No rashes or petechiae  Musculoskeletal: No peripheral edema or calf tenderness, Normal gross ROM   Neuro: Alert and oriented to person/place/time, normal sensation  Psychiatric: Anxious, cooperative, good eye contact    Emergency Department Course   ECG:  ECG taken at 1615, ECG read at 1619   Normal sinus rhythm normal EKG.  No old for comparison  Rate 70 bpm. UT interval 146 ms. QRS duration 88 ms. QT/QTc 364/393 ms. P-R-T axes 63 67 55.     Imaging:  XR Chest 2 Views   Final Result   IMPRESSION: Negative chest.          Laboratory:  Labs Ordered and Resulted from Time of ED Arrival Up to the Time of Departure from the ED   BASIC METABOLIC PANEL - Abnormal; Notable for the following components:       Result Value    Glucose 110 (*)     All other components within normal limits   HCG QUALITATIVE PREGNANCY - Normal   CBC WITH PLATELETS AND DIFFERENTIAL   EXTRA BLUE TOP TUBE   EXTRA RED TOP TUBE   EXTRA GREEN TOP (LITHIUM HEPARIN) TUBE   EXTRA PURPLE TOP TUBE   CBC WITH PLATELETS & DIFFERENTIAL    Narrative:     The following orders were created for panel order CBC with platelets differential.  Procedure                               Abnormality         Status                     ---------                               -----------         ------                     CBC with platelets and d...[[id number]]                      Final result                 Please view results for these tests on the individual orders.   EXTRA TUBE    Narrative:     The following orders were created for panel order Extra Tube ([place] Draw).  Procedure                               Abnormality         Status                     ---------                               -----------         ------                     Extra Blue Top Tube[[id number]]                              In process                 Extra Red Top  Tube[[id number]]                               In process                 Extra Green Top (Lithium...[[id number]]                      In process                 Extra Purple Top Tube[[id number]]                            In process                   Please view results for these tests on the individual orders.       Emergency Department Course:    Reviewed:  I reviewed nursing notes, vitals and past history    Assessments:   I obtained history and examined the patient as noted above.    I rechecked the patient and explained findings.     Interventions:  Medications - No data to display    Disposition:  The patient was discharged to home.    Impression & Plan      Medical Decision Making:  Leslie Villanueva is a 36 year old female presents for evaluation of upper respiratory symptoms. Patient is concerned for cough. Evaluation consisted of Physical exam. Non specific viral findings. Exam consistent with viral illness. No evidence of sepsis. No meningismus. Xray not indicated. Discharged with advice for symptomatic treatment including over the counter medication such as OTC daytime/night time cold medicine and Ibuprofen. RX for Tessalon  Perls given. Advised to follow up with primary care provider in 5-7 days if continued symptoms, sooner if worsening. Patient will return to the ER/UR if they develop high fevers not controlled with medication, difficulty breathing, shortness of breath, or has other concerns.     Covid-19  Leslie Villanueva was evaluated during a global COVID-19 pandemic, which necessitated consideration that the patient might be at risk for infection with the SARS-CoV-2 virus that causes COVID-19.   Applicable protocols for evaluation were followed during the patient's care.   COVID-19 was considered as part of the patient's evaluation.   a test was obtained at a previous visit and reviewed & considered today.    Diagnosis:    ICD-10-CM    1. Viral illness  B34.9        Discharge Medications:  Current  Discharge Medication List             Karri Hernandez, APRN CNP  10/11/21 9694

## 2021-11-19 ENCOUNTER — TELEPHONE (OUTPATIENT)
Dept: FAMILY MEDICINE | Facility: CLINIC | Age: 36
End: 2021-11-19

## 2021-11-19 ENCOUNTER — E-VISIT (OUTPATIENT)
Dept: FAMILY MEDICINE | Facility: CLINIC | Age: 36
End: 2021-11-19
Payer: COMMERCIAL

## 2021-11-19 DIAGNOSIS — Z20.822 SUSPECTED COVID-19 VIRUS INFECTION: ICD-10-CM

## 2021-11-19 DIAGNOSIS — Z11.52 ENCOUNTER FOR SCREENING FOR COVID-19: Primary | ICD-10-CM

## 2021-11-19 PROCEDURE — 99421 OL DIG E/M SVC 5-10 MIN: CPT | Performed by: FAMILY MEDICINE

## 2021-11-19 NOTE — TELEPHONE ENCOUNTER
Patient states she did E visit today. Patient is asking about monoclonal antibody therapy.  Patient is asking for response to E visit.   Lyla Bolivar RN

## 2021-11-21 NOTE — PATIENT INSTRUCTIONS
Dear Leslie Villanueva,    Your symptoms show that you may have coronavirus (COVID-19). This illness can cause fever, cough and trouble breathing. Many people get a mild case and get better on their own. Some people can get very sick.    Will I be tested for COVID-19?  We would like to test you for Covid-19 virus. I have placed orders for this test.     To schedule: go to your [email or web address] home page and scroll down to the section that says  You have an appointment that needs to be scheduled  and click the large green button that says  Schedule Now  and follow the steps to find the next available openings.    If you are unable to complete these [email or web address] scheduling steps, please call [phone] to schedule your testing.     Return to work/school/ guidance:  Please let your workplace manager and staffing office know when your quarantine ends     We can t give you an exact date as it depends on the above. You can calculate this on your own or work with your manager/staffing office to calculate this. (For example if you were exposed on 10/4, you would have to quarantine for 14 full days. That would be through 10/18. You could return on 10/19.)      If you receive a positive COVID-19 test result, follow the guidance of the those who are giving you the results. Usually the return to work is 10 (or in some cases 20 days from symptom onset.) If you work at [facility], you must also be cleared by Employee Occupational Health and Safety to return to work.        If you receive a negative COVID-19 test result and did not have a high risk exposure to someone with a known positive COVID-19 test, you can return to work once you're free of fever for 24 hours without fever-reducing medication and your symptoms are improving or resolved.      If you receive a negative COVID-19 test and If you had a high risk exposure to someone who has tested positive for COVID-19 then you can return to work 14 days after your last contact  with the positive individual    Note: If you have ongoing exposure to the covid positive person, this quarantine period may be more than 14 days. (For example, if you are continued to be exposed to your child who tested positive and cannot isolate from them, then the quarantine of 7-14 days can't start until your child is no longer contagious. This is typically 10 days from onset of the child's symptoms. So the total duration may be 17-24 days in this case.)    Sign up for [employer].   We know it's scary to hear that you might have COVID-19. We want to track your symptoms to make sure you're okay over the next 2 weeks. Please look for an email from [employer]--this is a free, online program that we'll use to keep in touch. To sign up, follow the link in the email you will receive. Learn more at [email or web address]    How can I take care of myself?    Get lots of rest. Drink extra fluids (unless a doctor has told you not to)    Take Tylenol (acetaminophen) or ibuprofen for fever or pain. If you have liver or kidney problems, ask your family doctor if it's okay to take Tylenol o ibuprofen    If you have other health problems (like cancer, heart failure, an organ transplant or severe kidney disease): Call your specialty clinic if you don't feel better in the next 2 days.    Know when to call 911. Emergency warning signs include:  o Trouble breathing or shortness of breath  o Pain or pressure in the chest that doesn't go away  o Feeling confused like you haven't felt before, or not being able to wake up  o Bluish-colored lips or face    Where can I get more information?  [facility] - About COVID-19:   [email or web address]    CDC - What to Do If You're Sick:   www.cdc.gov/coronavirus/2019-ncov/about/steps-when-sick.html

## 2022-05-21 ENCOUNTER — HEALTH MAINTENANCE LETTER (OUTPATIENT)
Age: 37
End: 2022-05-21

## 2022-09-18 ENCOUNTER — HEALTH MAINTENANCE LETTER (OUTPATIENT)
Age: 37
End: 2022-09-18

## 2023-06-04 ENCOUNTER — HEALTH MAINTENANCE LETTER (OUTPATIENT)
Age: 38
End: 2023-06-04

## 2024-02-25 ENCOUNTER — HEALTH MAINTENANCE LETTER (OUTPATIENT)
Age: 39
End: 2024-02-25

## 2024-07-14 ENCOUNTER — HEALTH MAINTENANCE LETTER (OUTPATIENT)
Age: 39
End: 2024-07-14